# Patient Record
Sex: FEMALE | Race: WHITE | URBAN - METROPOLITAN AREA
[De-identification: names, ages, dates, MRNs, and addresses within clinical notes are randomized per-mention and may not be internally consistent; named-entity substitution may affect disease eponyms.]

---

## 2023-10-09 ENCOUNTER — APPOINTMENT (RX ONLY)
Dept: URBAN - METROPOLITAN AREA CLINIC 141 | Facility: CLINIC | Age: 58
Setting detail: DERMATOLOGY
End: 2023-10-09

## 2023-10-09 DIAGNOSIS — I87.2 VENOUS INSUFFICIENCY (CHRONIC) (PERIPHERAL): ICD-10-CM | Status: INADEQUATELY CONTROLLED

## 2023-10-09 PROCEDURE — 99203 OFFICE O/P NEW LOW 30 MIN: CPT

## 2023-10-09 PROCEDURE — ? MEDICAL CONSULTATION: VENOUS DISEASE

## 2023-10-09 NOTE — PROCEDURE: MEDICAL CONSULTATION: VENOUS DISEASE
Right Leg Induration: 0- None
Left Dorsalis Pedis Pulse: 2 (Easily palpable)
Left Leg Venous Hyperpigmentation: 0- None or focal low intensity (tan)
Include Left Vcss In Note: Yes
Right Leg Circumference: medium
Left Leg Pain: 3- Daily, severe activity limitation or requiring regular analgesic use
Right Leg: Peripheral Vascular Disease?: No
Right Leg Compression Therapy: 2- Wears elastic stocking most days
Right Leg Varicose Veins: 1- Few, scattered: branch varicose veins
Detail Level: Detailed
Right Leg Venous Edema: 2- Afternoon edema above ankle
Length Of Time Symptoms Present (Include Units): 2 years

## 2023-10-09 NOTE — HPI: VARICOSE VEINS (VARICOSITIES)
How Severe Are They?: moderate
Is This A New Presentation, Or A Follow-Up?: Varicose Veins
Additional History: Pt had a recent Stomach ulcer, and recently found out she has an enlarged heart.

## 2023-11-27 ENCOUNTER — APPOINTMENT (RX ONLY)
Dept: URBAN - METROPOLITAN AREA CLINIC 141 | Facility: CLINIC | Age: 58
Setting detail: DERMATOLOGY
End: 2023-11-27

## 2023-11-27 DIAGNOSIS — I87.2 VENOUS INSUFFICIENCY (CHRONIC) (PERIPHERAL): ICD-10-CM

## 2023-11-27 PROCEDURE — 36465 NJX NONCMPND SCLRSNT 1 VEIN: CPT | Mod: RT

## 2023-11-27 PROCEDURE — ? VARITHENA SCLEROTHERAPY

## 2023-11-27 PROCEDURE — ? LOWER EXTREMITY DOPPLER US

## 2023-11-27 PROCEDURE — 93970 EXTREMITY STUDY: CPT

## 2023-11-27 PROCEDURE — ? VENOUS CLINICAL SEVERITY SCORE

## 2023-11-27 PROCEDURE — ? CEAP-C CLASSIFICATION

## 2023-11-27 PROCEDURE — ? VEIN TREATMENT PLAN

## 2023-11-27 ASSESSMENT — LOCATION ZONE DERM
LOCATION ZONE: LEG

## 2023-11-27 ASSESSMENT — LOCATION DETAILED DESCRIPTION DERM
LOCATION DETAILED: LEFT DISTAL PRETIBIAL REGION
LOCATION DETAILED: RIGHT DISTAL PRETIBIAL REGION
LOCATION DETAILED: RIGHT DISTAL PRETIBIAL REGION
LOCATION DETAILED: LEFT DISTAL PRETIBIAL REGION

## 2023-11-27 ASSESSMENT — LOCATION SIMPLE DESCRIPTION DERM
LOCATION SIMPLE: LEFT PRETIBIAL REGION
LOCATION SIMPLE: RIGHT PRETIBIAL REGION
LOCATION SIMPLE: RIGHT PRETIBIAL REGION
LOCATION SIMPLE: LEFT PRETIBIAL REGION

## 2023-11-27 NOTE — PROCEDURE: VARITHENA SCLEROTHERAPY
Sclerosant (A): Varithena Foam
Sclerosant Volume (Cc): 3
Render Post Care In Note: No
Volume Of Varithena Foam Removed From Canister (Cc): 0
Disposition: Compression stockings and short stretch elastic bandages were placed postoperatively.
Number Of Injections (Will Not Render If 0): 1
Detail Level: Detailed
Show Inventory Tab: Hide
Lot # [id number]: [id number]
Consent was obtained with risks, benefits, and alternatives discussed for the above procedures.
Expiration Date A (Month Year): [id number]

## 2023-11-27 NOTE — PROCEDURE: VEIN TREATMENT PLAN
Ugs Sessions - Left: 2
Symptom Statement (Will Not Render If Left Blank): US demonstrates severe reflux in the:\\n\\nBilateral GSVs and bilateral tributaries. \\n\\n\\nThe plan is to proceed with:\\n\\nRFA of the Bl GSV\\nVarithena of the  Bl D GSV\\nbilateral UGS. \\n\\nRisks, benefits, and alternatives were discussed.
Closing Statement (Will Not Render If Left Blank): Discussed treatment options. Risks and benefits of each procedure discussed. These are including but not limited to: deep vein thrombosis, pulmonary embolism, paresthesia, phlebitis, infection, bleeding, and visible scarring. After risks and benefits have been reviewed with the patient and all questions answered the patient has decided to move forward with treatment. Patient education booklet given and pre/post procedure instructions reviewed with the patient.
Lyssa Sessions - Right: 1
Detail Level: Detailed
Intro Statement (Will Not Render If Left Blank): The patient has signs and symptoms of chronic venous hypertension affecting daily function with US of the lower extremities demonstrating venous insufficiency. The patient has failed conservative treatment including avoiding prolonged standing, leg elevation, analgesics, exercise, weight management and graduated compression stockings (20-30mmHg or higher).

## 2023-11-27 NOTE — PROCEDURE: VENOUS CLINICAL SEVERITY SCORE
Include Left Vcss In Note: Yes
Left Leg Venous Edema: 0- None
Left Leg Compression Therapy: 3- Full compliance: stockings and elevation
Right Leg Pain: 2- Daily, moderate activity limitation, occasional analgesics
Detail Level: Simple
Right Leg Varicose Veins: 2- Multiple: GS varicose veins confined to calf or thigh
Right Leg Pigmentation: 0- None or focal low intensity (tan)

## 2023-11-27 NOTE — PROCEDURE: CEAP-C CLASSIFICATION
Right Leg Circumference: medium
Left Dorsalis Pedis Pulse: 2 (Easily palpable)
Detail Level: Detailed
Right Leg: Peripheral Vascular Disease?: No
Show Diagnoses Variable: Yes

## 2023-12-04 ENCOUNTER — APPOINTMENT (RX ONLY)
Dept: URBAN - METROPOLITAN AREA CLINIC 141 | Facility: CLINIC | Age: 58
Setting detail: DERMATOLOGY
End: 2023-12-04

## 2023-12-04 DIAGNOSIS — I87.2 VENOUS INSUFFICIENCY (CHRONIC) (PERIPHERAL): ICD-10-CM

## 2023-12-04 PROCEDURE — 36475 ENDOVENOUS RF 1ST VEIN: CPT | Mod: RT

## 2023-12-04 PROCEDURE — ? ENDOVENOUS ABLATION

## 2023-12-04 ASSESSMENT — LOCATION ZONE DERM: LOCATION ZONE: LEG

## 2023-12-04 ASSESSMENT — LOCATION SIMPLE DESCRIPTION DERM: LOCATION SIMPLE: RIGHT PRETIBIAL REGION

## 2023-12-04 ASSESSMENT — LOCATION DETAILED DESCRIPTION DERM: LOCATION DETAILED: RIGHT MEDIAL PROXIMAL PRETIBIAL REGION

## 2023-12-04 NOTE — PROCEDURE: ENDOVENOUS ABLATION
Detail Level: Simple
Rf Access: Below the knee
Medical Necessity Information: LCD Guidelines vary from payer to payer. Please check with your payer's policy to determine medical necessity.
Disposition: Compression dressings were placed and stockings. Wound care instructions were given, verbal and written.\\n\\nPatient was given Dr Hurtado's cell phone number to call with any questions or concerns
Number Of Sheaths Used: 1
Number Of Incisions Per Microphlebectomy: 0
Rf Cycles: 5
Rf Time (Include Units): 24 cm
Medical Necessity Clause: This therapy was medically necessary because the patient has
Estimated Blood Loss (Cc): minimal
Consent was obtained with risks, benefits, and alternatives discussed for the above procedures.  Photographs were taken. Preoperative medications were taken as above.
Body Location Override (Optional - Billing Will Still Be Based On Selected Body Map Location): right below knee
Hemostasis: Pressure
Show Inventory: Hide

## 2023-12-07 ENCOUNTER — APPOINTMENT (RX ONLY)
Dept: URBAN - METROPOLITAN AREA CLINIC 85 | Facility: CLINIC | Age: 58
Setting detail: DERMATOLOGY
End: 2023-12-07

## 2023-12-07 DIAGNOSIS — I87.2 VENOUS INSUFFICIENCY (CHRONIC) (PERIPHERAL): ICD-10-CM

## 2023-12-07 PROCEDURE — ? ENDOVENOUS ABLATION

## 2023-12-07 PROCEDURE — 36475 ENDOVENOUS RF 1ST VEIN: CPT | Mod: LT

## 2023-12-07 ASSESSMENT — LOCATION ZONE DERM: LOCATION ZONE: LEG

## 2023-12-07 ASSESSMENT — LOCATION SIMPLE DESCRIPTION DERM: LOCATION SIMPLE: RIGHT PRETIBIAL REGION

## 2023-12-07 ASSESSMENT — LOCATION DETAILED DESCRIPTION DERM: LOCATION DETAILED: RIGHT MEDIAL PROXIMAL PRETIBIAL REGION

## 2023-12-07 NOTE — PROCEDURE: ENDOVENOUS ABLATION
Medical Necessity Information: LCD Guidelines vary from payer to payer. Please check with your payer's policy to determine medical necessity.
Show Inventory: Hide
Number Of Catheters Used: 1
Rf Access: Below the knee
Consent was obtained with risks, benefits, and alternatives discussed for the above procedures.  Photographs were taken. Preoperative medications were taken as above.
Estimated Blood Loss (Cc): minimal
Hemostasis: Pressure
Medical Necessity Clause: This therapy was medically necessary because the patient has
Disposition: Compression dressings were placed and stockings. Wound care instructions were given, verbal and written.\\n\\nPatient was given Dr Hurtado's cell phone number to call with any questions or concerns
Body Location Override (Optional - Billing Will Still Be Based On Selected Body Map Location): left below knee
Rf Cycles: 6
Number Of Incisions Per Microphlebectomy: 0
Rf Time (Include Units): 35 cm
Detail Level: Simple

## 2023-12-11 ENCOUNTER — APPOINTMENT (RX ONLY)
Dept: URBAN - METROPOLITAN AREA CLINIC 141 | Facility: CLINIC | Age: 58
Setting detail: DERMATOLOGY
End: 2023-12-11

## 2023-12-11 DIAGNOSIS — I87.2 VENOUS INSUFFICIENCY (CHRONIC) (PERIPHERAL): ICD-10-CM

## 2023-12-11 PROCEDURE — ? VARITHENA SCLEROTHERAPY

## 2023-12-11 PROCEDURE — 36465 NJX NONCMPND SCLRSNT 1 VEIN: CPT | Mod: LT

## 2023-12-11 ASSESSMENT — LOCATION DETAILED DESCRIPTION DERM: LOCATION DETAILED: LEFT DISTAL PRETIBIAL REGION

## 2023-12-11 ASSESSMENT — LOCATION SIMPLE DESCRIPTION DERM: LOCATION SIMPLE: LEFT PRETIBIAL REGION

## 2023-12-11 ASSESSMENT — LOCATION ZONE DERM: LOCATION ZONE: LEG

## 2023-12-11 NOTE — PROCEDURE: VARITHENA SCLEROTHERAPY
Disposition: Compression stockings and short stretch elastic bandages were placed postoperatively.
Volume Of Varithena Foam Removed From Canister (Cc): 0
Sclerosant (A): Varithena Foam
Sclerosant (A) %: 1
Expiration Date A (Month Year): Oct2024
Detail Level: Detailed
Consent was obtained with risks, benefits, and alternatives discussed for the above procedures.
Render Post Care In Note: No
Show Inventory Tab: Hide
Lot # [id number]: [id number]
Sclerosant Volume (Cc): 3

## 2023-12-18 ENCOUNTER — APPOINTMENT (RX ONLY)
Dept: URBAN - METROPOLITAN AREA CLINIC 146 | Facility: CLINIC | Age: 58
Setting detail: DERMATOLOGY
End: 2023-12-18

## 2023-12-18 DIAGNOSIS — Z48.817 ENCOUNTER FOR SURGICAL AFTERCARE FOLLOWING SURGERY ON THE SKIN AND SUBCUTANEOUS TISSUE: ICD-10-CM

## 2023-12-18 PROCEDURE — ? ADDITIONAL NOTES

## 2023-12-18 PROCEDURE — ? POST-OP DOPPLER US

## 2023-12-18 ASSESSMENT — LOCATION SIMPLE DESCRIPTION DERM
LOCATION SIMPLE: RIGHT KNEE
LOCATION SIMPLE: RIGHT KNEE

## 2023-12-18 ASSESSMENT — LOCATION ZONE DERM
LOCATION ZONE: LEG
LOCATION ZONE: LEG

## 2023-12-18 ASSESSMENT — LOCATION DETAILED DESCRIPTION DERM
LOCATION DETAILED: RIGHT KNEE
LOCATION DETAILED: RIGHT KNEE

## 2023-12-18 NOTE — PROCEDURE: POST-OP DOPPLER US
Right Mid Gsv Reflux (Sec): no reflux
Left Distal Gsv Size (Cm): 0
Indication: Evaluate Postoperative Changes
Detail Level: Zone

## 2023-12-18 NOTE — PROCEDURE: ADDITIONAL NOTES
Additional Notes: Ultrasound showed evidence of inflammation in knee area post treatment, no dvt. Normal venous exam.
Detail Level: Simple
Render Risk Assessment In Note?: no

## 2024-02-08 ENCOUNTER — APPOINTMENT (RX ONLY)
Dept: URBAN - METROPOLITAN AREA CLINIC 85 | Facility: CLINIC | Age: 59
Setting detail: DERMATOLOGY
End: 2024-02-08

## 2024-02-08 DIAGNOSIS — I87.2 VENOUS INSUFFICIENCY (CHRONIC) (PERIPHERAL): ICD-10-CM

## 2024-02-08 PROCEDURE — 36471 NJX SCLRSNT MLT INCMPTNT VN: CPT | Mod: LT

## 2024-02-08 PROCEDURE — ? SCLEROTHERAPY

## 2024-02-08 PROCEDURE — 76942 ECHO GUIDE FOR BIOPSY: CPT

## 2024-02-08 ASSESSMENT — LOCATION ZONE DERM: LOCATION ZONE: LEG

## 2024-02-08 ASSESSMENT — LOCATION DETAILED DESCRIPTION DERM: LOCATION DETAILED: LEFT DISTAL PRETIBIAL REGION

## 2024-02-08 ASSESSMENT — LOCATION SIMPLE DESCRIPTION DERM: LOCATION SIMPLE: LEFT PRETIBIAL REGION

## 2024-02-08 NOTE — PROCEDURE: SCLEROTHERAPY
Sclerosant Volume (Cc): 3
Number Of Injections (Will Not Render If 0): 0
Disposition: Compression stockings and short stretch elastic bandages were placed postoperatively.
Number Of Injections (Will Not Render If 0): 10
Consent was obtained with risks, benefits, and alternatives discussed for the above procedures.  Photographs were taken.
Detail Level: Detailed
Post-Care Instructions: Compression for 3 weeks is critical to optimize your recovery and results. Compliance with compression helps to prevent blood clots and minimizes pain, swelling, bruising, skin discoloration (staining) and the recurrence of vessels.        Materials to gather for your wound care (all available over the counter):        Compression stockings x 2 pairs, 4 x 4 gauze, Comprilan wrap: 8 cm and 10 cm width wrap, Medical adhesive tape.       Compression and Wound care;  Leg compression for 3 weeks is reynoso to your success and safety. Compression at night is only needed the first day. After that, compression is needed only during waking hours.  However, if your leg feels better with compression at night, then you may continue compression at night as tolerated.       After the sclerotherapy procedure, 2 layers compression will be placed.       1. On the skin, folded or flat gauze will cover the treated areas.       2. A compression wrap (Comprilan) will be wrapped around your leg over the gauze. Once the compression wrap is in place, a compression stocking will be worn. This two layer  compression (wrap plus stocking) should be worn for the first 24 hours if tolerated.       3. After 24 hours, remove all compressions and dressings and just wear the compression stockings only during waking hours.        You will need to wear compression stockings for three weeks after your procedure, unless your physician instructs you otherwise.       Activity:       It is important NOT to be sitting or lying down for several hours after surgery. You may begin walking immediately after surgery. This is good for you, but take it easy.       For 2 days after treatment: Avoid aerobic exercise, weight training, and all other types of exertion that increase your breathing and pulse rates.       Do not get a tan for one month after sclerotherapy. Tanning increases your risk of skin discoloration.      Bathing:       After 24 hours, you may shower or bathe in tepid water, but keep the compression stocking on. Avoid immersion in hot tubs.      For pain or discomfort:       You may take acetaminophen (TylenolTM, Extra-Strength TylenolTM or DatrilTM) as directed.       Do not use aspirin, products containing aspirin, or ibuprofen (for example AdvilTM or MotrinTM) for five days after your surgery, unless approved by your physician.       Dietary restrictions: Do not drink alcoholic beverages for two days after your sclerotherapy procedure.       Possible side effects following sclerotherapy:  After sclerotherapy, mild swelling is expected. The injection sites may also become bruised or gray. You may also experience one or more of the following side effects, which almost always go away within one to four months:       Darkening of the injected veins.       Brownish staining of the skin.       Small clotted vessels under the surface of the skin that you can feel.       Bruising of the injection sites:       What to do about bruising - This will resolve within 2-3 weeks. If you wish the bruising to disappear sooner, then applying Arnicare cream (over the counter, health food stores) will help.       What to do if you feel small clotted vessels under the surface of the skin:       Call us for a follow up appointment. These small clots can be drained through a small nick.       Draining these small clots will help you heal faster and with less discoloration.       Contact your physician if you experience any of the following:       Treated areas become increasingly sore, tender, red or warm.        Acetaminophen does not relieve your discomfort.        Injection sites turn black or the skin around the site breaks down.        Ulceration of the injection sites.       You develop blisters from the tape.       You develop significant swelling or pain in the leg.       Darkening of large areas of the skin or foot.
Sclerosant (A) %: 0.50
Ultrasound Or Visual Sclerotherapy: Ultrasound (With [id number])
Render Post Care In Note: No

## 2024-04-18 ENCOUNTER — APPOINTMENT (RX ONLY)
Dept: URBAN - METROPOLITAN AREA CLINIC 85 | Facility: CLINIC | Age: 59
Setting detail: DERMATOLOGY
End: 2024-04-18

## 2024-04-18 DIAGNOSIS — M79.6 PAIN IN LIMB, HAND, FOOT, FINGERS AND TOES: ICD-10-CM

## 2024-04-18 PROBLEM — M79.609 PAIN IN UNSPECIFIED LIMB: Status: ACTIVE | Noted: 2024-04-18

## 2024-04-18 PROCEDURE — 99213 OFFICE O/P EST LOW 20 MIN: CPT

## 2024-04-18 PROCEDURE — ? FOLLOW UP ORDERS

## 2024-04-18 NOTE — PROCEDURE: FOLLOW UP ORDERS
Follow-Up Preamble: No vascular problems seen.\\n\\nRecommend that she be evaluated by ortho.\\n[facility] has a [facility].
Follow-Up (Free Text): PRN
Detail Level: Zone

## 2024-05-08 ENCOUNTER — APPOINTMENT (RX ONLY)
Dept: URBAN - METROPOLITAN AREA CLINIC 85 | Facility: CLINIC | Age: 59
Setting detail: DERMATOLOGY
End: 2024-05-08

## 2024-05-08 DIAGNOSIS — I87.2 VENOUS INSUFFICIENCY (CHRONIC) (PERIPHERAL): ICD-10-CM

## 2024-05-08 PROCEDURE — 93970 EXTREMITY STUDY: CPT

## 2024-05-08 PROCEDURE — ? LOWER EXTREMITY DOPPLER US

## 2024-05-08 NOTE — PROCEDURE: LOWER EXTREMITY DOPPLER US
Size In Mm: 0
Size In Mm: 3.9
Reflux Options: Use Free Text
Treatment Number (Optional): Long Term F/U
Size In Mm: 3.7
Include Thrombophlebitis Instructions: Yes
Size In Mm: 3.0
Left Tributary Size In Mm: 3.6
Right Intraluminal Thrombus- Yes: The right deep veins were imaged from the level of the common femoral vein to the posterior tibial veins. There was evidence of intraluminal thrombus as noted above.
Recommend Sclerotherapy With Ultrasound Guidance On Right Side: No
Detail Level: Simple
Right Lower Extremity Comments: TRIBS LATERAL THHIGH/CALF.
Size In Mm: 4.7
Size In Mm: 3.8
Left Lower Extremity Comments: TRIB MEDIAL THIGH/KNEE.
Left Tributary Reflux (In Seconds - Leave Blank If Not Applicable): 2.0
Right Tributary Reflux (In Seconds - Leave Blank If Not Applicable): 0.7
Size In Mm: 5.4
See Attached Documentation Text: Please refer to the attached ultrasound documentation for complete details of the procedure and the venous findings.
Right Tributary Size In Mm: 3.1

## 2024-05-16 ENCOUNTER — APPOINTMENT (RX ONLY)
Dept: URBAN - METROPOLITAN AREA CLINIC 85 | Facility: CLINIC | Age: 59
Setting detail: DERMATOLOGY
End: 2024-05-16

## 2024-05-16 DIAGNOSIS — I87.2 VENOUS INSUFFICIENCY (CHRONIC) (PERIPHERAL): ICD-10-CM

## 2024-05-16 PROCEDURE — 99213 OFFICE O/P EST LOW 20 MIN: CPT

## 2024-05-16 PROCEDURE — ? FOLLOW UP ORDERS

## 2024-05-16 NOTE — PROCEDURE: FOLLOW UP ORDERS
Detail Level: Zone
Follow-Up Preamble: Patient is s/p ablations of the bilateral GSV and UGS. Her legs feel great. No pain. Very happy. \\n\\nCurrent US shows no axial reflux.
Follow-Up (Free Text): She will follow up with an US in one year.

## 2025-05-19 ENCOUNTER — APPOINTMENT (OUTPATIENT)
Dept: URBAN - METROPOLITAN AREA CLINIC 141 | Facility: CLINIC | Age: 60
Setting detail: DERMATOLOGY
End: 2025-05-19

## 2025-05-19 DIAGNOSIS — I87.2 VENOUS INSUFFICIENCY (CHRONIC) (PERIPHERAL): ICD-10-CM

## 2025-05-19 PROCEDURE — ? LOWER EXTREMITY DOPPLER US

## 2025-05-19 PROCEDURE — ? VEIN TREATMENT PLAN

## 2025-05-19 PROCEDURE — ? VARITHENA SCLEROTHERAPY

## 2025-05-19 PROCEDURE — ? CEAP-C CLASSIFICATION

## 2025-05-19 PROCEDURE — ? VENOUS CLINICAL SEVERITY SCORE

## 2025-05-19 PROCEDURE — 93970 EXTREMITY STUDY: CPT

## 2025-05-19 PROCEDURE — 99213 OFFICE O/P EST LOW 20 MIN: CPT | Mod: NC

## 2025-05-19 PROCEDURE — 36465 NJX NONCMPND SCLRSNT 1 VEIN: CPT | Mod: LT

## 2025-05-19 ASSESSMENT — LOCATION ZONE DERM
LOCATION ZONE: LEG
LOCATION ZONE: LEG

## 2025-05-19 ASSESSMENT — LOCATION SIMPLE DESCRIPTION DERM
LOCATION SIMPLE: RIGHT PRETIBIAL REGION
LOCATION SIMPLE: LEFT PRETIBIAL REGION
LOCATION SIMPLE: LEFT PRETIBIAL REGION

## 2025-05-19 ASSESSMENT — LOCATION DETAILED DESCRIPTION DERM
LOCATION DETAILED: LEFT DISTAL PRETIBIAL REGION
LOCATION DETAILED: RIGHT DISTAL PRETIBIAL REGION
LOCATION DETAILED: LEFT DISTAL PRETIBIAL REGION

## 2025-05-19 NOTE — PROCEDURE: VENOUS CLINICAL SEVERITY SCORE
Include Left Vcss In Note: Yes
Left Leg Compression Therapy: 1- Intermittent use of stockings
Left Leg Ulcer Duration: 0- None
Detail Level: Simple
Right Leg Compression Therapy: 0- Not used
Left Leg Pigmentation: 0- None or focal
Left Leg Varicose Veins: 1- Few: scattered (ie, isolated branch varicosities or clusters) Also includes corona phlebectatica (ankle flare)
Left Leg Pain: 2- Daily pain or other discomfort (ie, interfering with but not preventing regular daily activities)

## 2025-05-19 NOTE — HPI: VARICOSE VEINS (VARICOSITIES)
How Severe Are They?: moderate
Is This A New Presentation, Or A Follow-Up?: Follow Up Varicosities
Additional History: Pt here for long term US and Fu.. she still gets some pain and aching at times in the left leg.

## 2025-05-19 NOTE — PROCEDURE: CEAP-C CLASSIFICATION
Show Diagnoses Variable: Yes
Right Leg: Peripheral Vascular Disease?: No
Right Leg Circumference: medium
Right Dorsalis Pedis Pulse: 2 (Easily palpable)
Detail Level: Detailed

## 2025-05-19 NOTE — PROCEDURE: LOWER EXTREMITY DOPPLER US
Use - 'see Attached Documentation' Verbiage?: No
Size In Mm: 2.9
Size In Mm: 3.4
Continue Conservative Therapy: Yes
Size In Mm: 5.1
Left Lower Extremity Comments: GSV reflux proximal calf to mid thigh only,
See Attached Documentation Text: Please refer to the attached ultrasound documentation for complete details of the procedure and the venous findings.
Reflux (In Seconds - Leave Blank If Not Applicable): 1.7
Size In Mm: 3.12
Detail Level: Simple
Size In Mm: 4.0
Size In Mm: 3.3
Treatment Number (Optional): Long Term F/U: L GSV, D GSV Reflux
Right Intraluminal Thrombus- Yes: The right deep veins were imaged from the level of the common femoral vein to the posterior tibial veins. There was evidence of intraluminal thrombus as noted above.
Size Options: Use Free Text
Reflux (In Seconds - Leave Blank If Not Applicable): 1.8
Size In Mm: 0
Size In Mm: 3.7

## 2025-05-19 NOTE — PROCEDURE: VARITHENA SCLEROTHERAPY
Consent was obtained with risks, benefits, and alternatives discussed for the above procedures.
Sclerosant Volume (Cc): 3
Volume Of Varithena Foam Removed From Canister (Cc): 0
Expiration Date A (Month Year): Feb2026
Render Post Care In Note: No
Disposition: Compression stockings and short stretch elastic bandages were placed postoperatively.
Sclerosant (A) %: 1
Sclerosant (A): Varithena Foam
Show Inventory Tab: Hide
Lot # [id number]: [id number]
Detail Level: Detailed

## 2025-05-19 NOTE — PROCEDURE: VEIN TREATMENT PLAN
Detail Level: Detailed
Lyssa Sessions - Left: 1
Closing Statement (Will Not Render If Left Blank): Discussed treatment options. Risks and benefits of each procedure discussed. These are including but not limited to: deep vein thrombosis, pulmonary embolism, paresthesia, phlebitis, infection, bleeding, and visible scarring. After risks and benefits have been reviewed with the patient and all questions answered the patient has decided to move forward with treatment. Patient education booklet given and pre/post procedure instructions reviewed with the patient.
Intro Statement (Will Not Render If Left Blank): The patient has signs and symptoms of chronic venous hypertension affecting daily function with US of the lower extremities demonstrating venous insufficiency. She has had prior ablations. The patient has failed conservative treatment including avoiding prolonged standing, leg elevation, analgesics, exercise, weight management and graduated compression stockings (20-30mmHg or higher) for at least 90 days.
Symptom Statement (Will Not Render If Left Blank): US demonstrates severe reflux in the:\\n\\nLeft  mid-distal GSV.\\n(This is a partial recannulization. This vein was closed a year ago.)\\n\\n\\nThe plan is to proceed with:\\n\\nL D Gsv varithena \\n\\n\\nRisks, benefits, and alternatives were discussed.